# Patient Record
Sex: MALE | Race: WHITE | Employment: OTHER | URBAN - METROPOLITAN AREA
[De-identification: names, ages, dates, MRNs, and addresses within clinical notes are randomized per-mention and may not be internally consistent; named-entity substitution may affect disease eponyms.]

---

## 2019-04-24 PROBLEM — G93.40 ENCEPHALOPATHY: Status: ACTIVE | Noted: 2019-04-24

## 2019-04-24 PROBLEM — R41.3 MEMORY LOSS OR IMPAIRMENT: Status: ACTIVE | Noted: 2019-04-24

## 2019-04-24 PROBLEM — R29.3 POSTURAL IMBALANCE: Status: ACTIVE | Noted: 2019-04-24
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
## 2019-04-24 PROBLEM — N39.3 STRESS INCONTINENCE OF URINE: Status: ACTIVE | Noted: 2019-04-24

## 2019-04-24 PROBLEM — Z79.899 ENCOUNTER FOR MEDICATION MANAGEMENT: Status: ACTIVE | Noted: 2019-04-24

## 2019-05-14 PROBLEM — E53.8 VITAMIN B12 DEFICIENCY: Status: ACTIVE | Noted: 2019-05-14

## 2019-05-14 PROBLEM — M79.604 PAIN IN BOTH LOWER EXTREMITIES: Status: ACTIVE | Noted: 2019-05-14

## 2019-05-14 PROBLEM — G62.9 PERIPHERAL POLYNEUROPATHY: Status: ACTIVE | Noted: 2019-05-14

## 2019-05-14 PROBLEM — M79.605 PAIN IN BOTH LOWER EXTREMITIES: Status: ACTIVE | Noted: 2019-05-14

## 2019-05-21 ENCOUNTER — HOSPITAL ENCOUNTER (OUTPATIENT)
Dept: INTERVENTIONAL RADIOLOGY/VASCULAR | Age: 69
Discharge: HOME | End: 2019-05-21
Attending: PSYCHIATRY & NEUROLOGY

## 2019-05-21 ENCOUNTER — HOSPITAL ENCOUNTER (OUTPATIENT)
Dept: INTERVENTIONAL RADIOLOGY/VASCULAR | Age: 69
Discharge: HOME | End: 2019-05-21
Attending: PSYCHIATRY & NEUROLOGY
Payer: MEDICARE

## 2019-05-21 VITALS
DIASTOLIC BLOOD PRESSURE: 82 MMHG | BODY MASS INDEX: 23.19 KG/M2 | RESPIRATION RATE: 16 BRPM | WEIGHT: 162 LBS | OXYGEN SATURATION: 94 % | SYSTOLIC BLOOD PRESSURE: 153 MMHG | TEMPERATURE: 98.3 F | HEART RATE: 61 BPM | HEIGHT: 70 IN

## 2019-05-21 DIAGNOSIS — G93.40 ENCEPHALOPATHY: ICD-10-CM

## 2019-05-21 LAB
APPEARANCE FLD: CLEAR
COLOR FLD: COLORLESS
GLUCOSE CSF-MCNC: 62 MG/DL (ref 40–70)
NUC CELL # FLD: 2 /CU MM
PROT CSF-MCNC: 60 MG/DL (ref 15–45)
RBC # FLD: 2 /CU MM
SPECIMEN SOURCE FLD: NORMAL
TUBE # CSF: 1
TUBE # CSF: 1

## 2019-05-21 PROCEDURE — 87476 LYME DIS DNA AMP PROBE: CPT

## 2019-05-21 PROCEDURE — 89050 BODY FLUID CELL COUNT: CPT

## 2019-05-21 PROCEDURE — 62270 DX LMBR SPI PNXR: CPT

## 2019-05-21 PROCEDURE — 82945 GLUCOSE OTHER FLUID: CPT

## 2019-05-21 PROCEDURE — 74011250636 HC RX REV CODE- 250/636: Performed by: PHYSICIAN ASSISTANT

## 2019-05-21 PROCEDURE — 77030003666 HC NDL SPINAL BD -A

## 2019-05-21 PROCEDURE — 84157 ASSAY OF PROTEIN OTHER: CPT

## 2019-05-21 PROCEDURE — 77030014143 HC TY PUNC LUMBR BD -A

## 2019-05-21 PROCEDURE — 87205 SMEAR GRAM STAIN: CPT

## 2019-05-21 RX ORDER — CEFAZOLIN SODIUM/WATER 2 G/20 ML
2 SYRINGE (ML) INTRAVENOUS ONCE
Status: DISCONTINUED | OUTPATIENT
Start: 2019-05-21 | End: 2019-05-21

## 2019-05-21 RX ORDER — BUPIVACAINE HYDROCHLORIDE 5 MG/ML
20 INJECTION, SOLUTION EPIDURAL; INTRACAUDAL ONCE
Status: DISCONTINUED | OUTPATIENT
Start: 2019-05-21 | End: 2019-05-21

## 2019-05-21 RX ORDER — LIDOCAINE HYDROCHLORIDE 20 MG/ML
1-10 INJECTION, SOLUTION EPIDURAL; INFILTRATION; INTRACAUDAL; PERINEURAL ONCE
Status: COMPLETED | OUTPATIENT
Start: 2019-05-21 | End: 2019-05-21

## 2019-05-21 RX ADMIN — LIDOCAINE HYDROCHLORIDE 6 ML: 20 INJECTION, SOLUTION EPIDURAL; INFILTRATION; INTRACAUDAL; PERINEURAL at 13:40

## 2019-05-21 NOTE — PROGRESS NOTES
TRANSFER - OUT REPORT:    Verbal report given to Dk Cherry on [employer]  being transferred to IR for routine progression of care       Report consisted of patients Situation, Background, Assessment and   Recommendations(SBAR). Information from the following report(s) SBAR and Procedure Summary was reviewed with the receiving nurse.     Lines:         Patient transported with:   Registered Nurse

## 2019-05-21 NOTE — DISCHARGE INSTRUCTIONS
[address]  Department of Interventional Radiology  [facility]  [phone] Office  [phone] Fax    POST LUMBAR PUNCTURE DISCHARGE INSTRUCTIONS  General Information:    Lumbar Puncture: A LP is done to help diagnose several disorders, like pseudo tumor, migraines, meningitis, and multiple sclerosis. It involves a puncture (usually in the lower spine) into the sac that protects the spinal column. A sample of the fluid in that space is removed and tested in the lab. Call If:   You should call your Physician and/or the Radiology Nurse if you develop a headache that is not relieved by Tylenol, and worsens when you stand and eases when you lie down, you need to call. You may have developed what is referred to as a spinal headache. Our physicians will probably advise you to be on strict bed rest for 24 hours, to drink lots of fluids and caffeine. If this does not help the head pain, call again the next day. You should call if you have bleeding other than a small spot on your bandage. You should call if you have any numbness, tingling, weakness, fever, chills, urinary retention, severe itching, rash, welts, swelling, or confusion. Monitor procedure site for signs and symptoms of infection which include but are not limited to redness, increase pain, swelling, drainage of foul color or odor, warmth at site, or fever. Follow-up Instructions: See the doctor who ordered your procedure as he/she has instructed. If you had a Lumbar Puncture or Myelogram, your results should be available to your ordering doctor in 3-5 business days. You can remove your dressing in 24 hours and shower regularly. Do not bathe or swim for 72 hours. To Reach Us: If you have any questions about your procedure, please call the Interventional Radiology department at [phone].  After business hours (5pm) and weekends, call the answering service at [phone] and ask for the Radiologist on call to be paged. Interventional Radiology General Nurse Discharge    After general anesthesia or intravenous sedation, for 24 hours or while taking prescription Narcotics:  · Limit your activities  · Do not drive and operate hazardous machinery  · Do not make important personal or business decisions  · Do  not drink alcoholic beverages  · If you have not urinated within 8 hours after discharge, please contact your surgeon on call. * Please give a list of your current medications to your Primary Care Provider. * Please update this list whenever your medications are discontinued, doses are     changed, or new medications (including over-the-counter products) are added. * Please carry medication information at all times in case of emergency situations. These are general instructions for a healthy lifestyle:    No smoking/ No tobacco products/ Avoid exposure to second hand smoke  Surgeon General's Warning:  Quitting smoking now greatly reduces serious risk to your health. Obesity, smoking, and sedentary lifestyle greatly increases your risk for illness  A healthy diet, regular physical exercise & weight monitoring are important for maintaining a healthy lifestyle    You may be retaining fluid if you have a history of heart failure or if you experience any of the following symptoms:  Weight gain of 3 pounds or more overnight or 5 pounds in a week, increased swelling in our hands or feet or shortness of breath while lying flat in bed. Please call your doctor as soon as you notice any of these symptoms; do not wait until your next office visit. Recognize signs and symptoms of STROKE:  F-face looks uneven    A-arms unable to move or move unevenly    S-speech slurred or non-existent    T-time-call 911 as soon as signs and symptoms begin-DO NOT go       Back to bed or wait to see if you get better-TIME IS BRAIN.       Patient Signature:  Date: 5/21/2019

## 2019-05-23 LAB
B BURGDOR DNA SPEC QL NAA+PROBE: NEGATIVE
SPECIMEN SOURCE: NORMAL

## 2019-05-26 LAB
BACTERIA SPEC CULT: NORMAL
GRAM STN SPEC: NORMAL
GRAM STN SPEC: NORMAL
SERVICE CMNT-IMP: NORMAL

## 2019-05-29 NOTE — PROGRESS NOTES
Basically good result. No therapeutic change to recommend.     Jr Armenta MD  Consultative Neurology, Neurodiagnostics and Neurotherapeutics  Neuroelectrophysiology, EEG, EMG  [facility]  [facility], [address]   Phone:  [phone]  Fax:   [phone]

## 2019-06-01 ENCOUNTER — HOSPITAL ENCOUNTER (OUTPATIENT)
Dept: MRI IMAGING | Age: 69
Discharge: HOME | End: 2019-06-01
Attending: PSYCHIATRY & NEUROLOGY
Payer: MEDICARE

## 2019-06-01 DIAGNOSIS — G93.40 ENCEPHALOPATHY: ICD-10-CM

## 2019-06-01 DIAGNOSIS — R41.82 ALTERED MENTAL STATUS, UNSPECIFIED ALTERED MENTAL STATUS TYPE: ICD-10-CM

## 2019-06-01 PROCEDURE — 70551 MRI BRAIN STEM W/O DYE: CPT

## 2019-06-03 NOTE — PROGRESS NOTES
As discussed - this MRI at [facility] revealed changes of brain similar to what I see described on the MRI elsewhere 6 weeks ago. As far as change to suggest in treatment I do not find any new suggestion that would be recommended. I do think we should cover for depression, and agree that he should see Dr. Yas Rowland back about starting something like an antidepressant such as Lexapro etc.  As we discussed. I note that we will see him next week to review all this again. No changes to make yet.      Cheri Pulido MD  Consultative Neurology, Neurodiagnostics and Neurotherapeutics  Neuroelectrophysiology, EEG, EMG  [facility]  [address] [facility]  [place], [address] [facility]  Phone:  [phone]  Fax:   [phone]

## 2019-06-11 PROBLEM — R90.89: Status: ACTIVE | Noted: 2019-04-24

## 2019-06-11 PROBLEM — M79.606 PAIN OF LOWER EXTREMITY: Status: ACTIVE | Noted: 2019-06-11

## 2019-12-16 PROBLEM — M79.2 NEUROPATHIC PAIN: Status: ACTIVE | Noted: 2019-12-16

## 2019-12-16 PROBLEM — G25.81 RESTLESS LEGS SYNDROME (RLS): Status: ACTIVE | Noted: 2019-12-16
